# Patient Record
Sex: FEMALE | Race: WHITE | URBAN - NONMETROPOLITAN AREA
[De-identification: names, ages, dates, MRNs, and addresses within clinical notes are randomized per-mention and may not be internally consistent; named-entity substitution may affect disease eponyms.]

---

## 2024-07-01 ENCOUNTER — TELEPHONE (OUTPATIENT)
Dept: HEMATOLOGY | Age: 34
End: 2024-07-01

## 2024-07-01 RX ORDER — KETOROLAC TROMETHAMINE 10 MG/1
10 TABLET, FILM COATED ORAL EVERY 6 HOURS PRN
COMMUNITY

## 2024-07-01 RX ORDER — BUPROPION HYDROCHLORIDE 150 MG/1
150 TABLET ORAL EVERY MORNING
COMMUNITY

## 2024-07-01 RX ORDER — M-VIT,TX,IRON,MINS/CALC/FOLIC 27MG-0.4MG
1 TABLET ORAL DAILY
COMMUNITY

## 2024-07-01 NOTE — TELEPHONE ENCOUNTER
Called patient and reminded patient of their appointment on 7/3/2024 and patient confirmed they would be here.

## 2024-07-01 NOTE — PROGRESS NOTES
Thrombocytopenia (HCC)    4. Encounter for screening for other viral diseases        ASSESSMENT/PLAN:      Scot reports that she has known about low platelets for several years.  She reports that she has always noticed that her MPV is elevated as well.  She is also known that she has had intermittent low white count.  She denies any bleeding with any surgical procedures.    She has experienced a few urinary tract infections over the past couple years.  She did have an infection requiring IV antibiotics after she had a wisdom tooth pulled in the past.  She denies any recurrent pulmonary infections.  She has had COVID-19 on 2 separate occasions-the second 1 was more drastic but she recovered well.    She denies any history of liver dysfunction or any autoimmune illnesses.    Serology 6/5/2024  TSH-1.27  CMP-WNL            CBC today reveals a WBC of 3.29 with 38.7% PMN with ANC 1.27, 48.9% lymphocyte, 9.7% monocyte, 1.8% eosinophil, 1.9% basophil, Hgb 13.6, MCV 95.9, PLT 1 45,000.    I discussed the abnormalities noted on her CBC.  She has mild leukopenia without neutropenia.  She does have more lymphocytes than PMN but no priyanka relative lymphocytosis.  She does not have any palpable peripheral lymphadenopathy no hepatosplenomegaly.  No B symptoms.    She has mild macrocytosis, very minimal thrombocytopenia with slightly elevated MPV.  I discussed the fact that it appears that her bone marrow is making platelets.  I discussed potential chronic ITP or possibly even underlying autoimmune leukopenia.      She does not have any significant history of chronic or recurrent significant infections.  She has had a few urinary tract infections that respond to antibiotics well.    I discussed potential causes of her mild leukopenia, thrombocytopenia, macrocytosis.  This could include but not limited to nutritional deficiencies, autoimmune illnesses, viral illnesses, primary bone marrow disorders.  I am sending peripheral blood

## 2024-07-03 ENCOUNTER — HOSPITAL ENCOUNTER (OUTPATIENT)
Dept: INFUSION THERAPY | Age: 34
Discharge: HOME | End: 2024-07-03
Payer: COMMERCIAL

## 2024-07-03 ENCOUNTER — OFFICE VISIT (OUTPATIENT)
Dept: HEMATOLOGY | Age: 34
End: 2024-07-03
Payer: COMMERCIAL

## 2024-07-03 VITALS
HEIGHT: 66 IN | OXYGEN SATURATION: 99 % | HEART RATE: 58 BPM | TEMPERATURE: 98.7 F | DIASTOLIC BLOOD PRESSURE: 72 MMHG | BODY MASS INDEX: 23.77 KG/M2 | WEIGHT: 147.9 LBS | SYSTOLIC BLOOD PRESSURE: 114 MMHG

## 2024-07-03 DIAGNOSIS — D72.819 LEUKOPENIA, UNSPECIFIED TYPE: ICD-10-CM

## 2024-07-03 DIAGNOSIS — D69.6 THROMBOCYTOPENIA (HCC): ICD-10-CM

## 2024-07-03 DIAGNOSIS — Z11.59 ENCOUNTER FOR SCREENING FOR OTHER VIRAL DISEASES: ICD-10-CM

## 2024-07-03 DIAGNOSIS — D75.89 MACROCYTOSIS: ICD-10-CM

## 2024-07-03 DIAGNOSIS — D72.819 LEUKOPENIA, UNSPECIFIED TYPE: Primary | ICD-10-CM

## 2024-07-03 LAB
ALBUMIN SERPL-MCNC: 4.9 G/DL (ref 3.5–5.2)
ALP SERPL-CCNC: 55 U/L (ref 35–104)
ALT SERPL-CCNC: 22 U/L (ref 9–52)
ANION GAP SERPL CALCULATED.3IONS-SCNC: 11 MMOL/L (ref 7–19)
APTT PPP: 27.4 SEC (ref 26–36.2)
AST SERPL-CCNC: 27 U/L (ref 14–36)
BASOPHILS # BLD: 0.03 K/UL (ref 0.01–0.08)
BASOPHILS NFR BLD: 0.9 % (ref 0.1–1.2)
BILIRUB SERPL-MCNC: 1 MG/DL (ref 0.2–1.3)
BUN SERPL-MCNC: 14 MG/DL (ref 7–17)
CALCIUM SERPL-MCNC: 9.5 MG/DL (ref 8.4–10.2)
CHLORIDE SERPL-SCNC: 105 MMOL/L (ref 98–111)
CO2 SERPL-SCNC: 26 MMOL/L (ref 22–29)
CREAT SERPL-MCNC: 0.8 MG/DL (ref 0.5–1)
EOSINOPHIL # BLD: 0.06 K/UL (ref 0.04–0.54)
EOSINOPHIL NFR BLD: 1.8 % (ref 0.7–7)
ERYTHROCYTE [DISTWIDTH] IN BLOOD BY AUTOMATED COUNT: 11.4 % (ref 11.7–14.4)
FOLATE SERPL-MCNC: >20 NG/ML (ref 4.8–37.3)
GLOBULIN: 2.8 G/DL
GLUCOSE SERPL-MCNC: 86 MG/DL (ref 74–106)
HAPTOGLOB SERPL-MCNC: 85 MG/DL (ref 30–200)
HAV IGM SERPL QL IA: NORMAL
HBV CORE IGM SERPL QL IA: NORMAL
HBV SURFACE AG SERPL QL IA: NORMAL
HCT VFR BLD AUTO: 39.5 % (ref 34.1–44.9)
HCV AB SERPL QL IA: NORMAL
HGB BLD-MCNC: 13.6 G/DL (ref 11.2–15.7)
HIV-1 P24 AG: NORMAL
HIV1+2 AB SERPLBLD QL IA.RAPID: NORMAL
INR PPP: 1.14 (ref 0.88–1.18)
LYMPHOCYTES # BLD: 1.61 K/UL (ref 1.18–3.74)
LYMPHOCYTES NFR BLD: 48.9 % (ref 19.3–53.1)
MCH RBC QN AUTO: 33 PG (ref 25.6–32.2)
MCHC RBC AUTO-ENTMCNC: 34.4 G/DL (ref 32.3–35.5)
MCV RBC AUTO: 95.9 FL (ref 79.4–94.8)
MONOCYTES # BLD: 0.32 K/UL (ref 0.24–0.82)
MONOCYTES NFR BLD: 9.7 % (ref 4.7–12.5)
NEUTROPHILS # BLD: 1.27 K/UL (ref 1.56–6.13)
NEUTS SEG NFR BLD: 38.7 % (ref 34–71.1)
PLATELET # BLD AUTO: 145 K/UL (ref 182–369)
PLATELETS.RETICULATED NFR BLD AUTO: 5.3 % (ref 0.9–6.5)
PMV BLD AUTO: 10.8 FL (ref 7.4–10.4)
POTASSIUM SERPL-SCNC: 3.7 MMOL/L (ref 3.5–5.1)
PROT SERPL-MCNC: 7.7 G/DL (ref 6.3–8.2)
PROTHROMBIN TIME: 14.3 SEC (ref 12–14.6)
RBC # BLD AUTO: 4.12 M/UL (ref 3.93–5.22)
RHEUMATOID FACT SER NEPH-ACNC: <10 IU/ML
SODIUM SERPL-SCNC: 142 MMOL/L (ref 137–145)
VIT B12 SERPL-MCNC: 1076 PG/ML (ref 211–946)
WBC # BLD AUTO: 3.29 K/UL (ref 3.98–10.04)

## 2024-07-03 PROCEDURE — 99202 OFFICE O/P NEW SF 15 MIN: CPT

## 2024-07-03 PROCEDURE — 99204 OFFICE O/P NEW MOD 45 MIN: CPT | Performed by: PHYSICIAN ASSISTANT

## 2024-07-03 PROCEDURE — 1036F TOBACCO NON-USER: CPT | Performed by: PHYSICIAN ASSISTANT

## 2024-07-03 PROCEDURE — 85025 COMPLETE CBC W/AUTO DIFF WBC: CPT

## 2024-07-03 PROCEDURE — G8427 DOCREV CUR MEDS BY ELIG CLIN: HCPCS | Performed by: PHYSICIAN ASSISTANT

## 2024-07-03 PROCEDURE — 80053 COMPREHEN METABOLIC PANEL: CPT

## 2024-07-03 PROCEDURE — 36415 COLL VENOUS BLD VENIPUNCTURE: CPT

## 2024-07-03 PROCEDURE — G8420 CALC BMI NORM PARAMETERS: HCPCS | Performed by: PHYSICIAN ASSISTANT

## 2024-07-03 ASSESSMENT — ENCOUNTER SYMPTOMS
WHEEZING: 0
BLOOD IN STOOL: 0
COUGH: 0
VOMITING: 0
PHOTOPHOBIA: 0
EYE ITCHING: 0
VOICE CHANGE: 0
CONSTIPATION: 0
SHORTNESS OF BREATH: 0
SORE THROAT: 0
ABDOMINAL DISTENTION: 0
BACK PAIN: 0
NAUSEA: 0
ABDOMINAL PAIN: 0
DIARRHEA: 0
EYE DISCHARGE: 0
COLOR CHANGE: 0
TROUBLE SWALLOWING: 0

## 2024-07-05 LAB
COPPER SERPL-MCNC: 96.5 UG/DL (ref 80–155)
NUCLEAR IGG SER QL IA: NORMAL
ZINC SERPL-MCNC: 83.8 UG/DL (ref 60–120)

## 2024-07-06 LAB
ALBUMIN SERPL-MCNC: 4.56 G/DL (ref 3.75–5.01)
ALPHA1 GLOB SERPL ELPH-MCNC: 0.25 G/DL (ref 0.19–0.46)
ALPHA2 GLOB SERPL ELPH-MCNC: 0.58 G/DL (ref 0.48–1.05)
B-GLOBULIN SERPL ELPH-MCNC: 0.74 G/DL (ref 0.48–1.1)
DEPRECATED KAPPA LC FREE/LAMBDA SER: 1.01 {RATIO} (ref 0.26–1.65)
EER MONOCLONAL PROTEIN AND FLC, SERUM: NORMAL
GAMMA GLOB SERPL ELPH-MCNC: 1.07 G/DL (ref 0.62–1.51)
IGA SERPL-MCNC: 170 MG/DL (ref 68–408)
IGG SERPL-MCNC: 1025 MG/DL (ref 768–1632)
IGM SERPL-MCNC: 143 MG/DL (ref 35–263)
INTERPRETATION SERPL IFE-IMP: NORMAL
INTERPRETATION SERPL IFE-IMP: NORMAL
KAPPA LC FREE SER-MCNC: 16.6 MG/L (ref 3.3–19.4)
LAMBDA LC FREE SERPL-MCNC: 16.37 MG/L (ref 5.71–26.3)
MONOCLONAL PROTEIN, SERUM: NORMAL G/DL
PA IGG BLD QL FC: NEGATIVE
PA IGM BLD QL FC: POSITIVE
PROT SERPL-MCNC: 7.2 G/DL (ref 6.3–8.2)

## 2024-08-09 ENCOUNTER — TELEPHONE (OUTPATIENT)
Dept: HEMATOLOGY | Age: 34
End: 2024-08-09

## 2024-08-09 NOTE — TELEPHONE ENCOUNTER
Called patient and reminded patient of their appointment for 08/14/24. I made patient aware not to come at the lab appointment time but to come at ONLY the follow up appointment time. Patient voiced understanding of appointment information and was also advised that we are now in the [facility] and gave the address and instructions of exactly where we were located. Patient is aware not to arrive over 5-10 minutes early.

## 2024-08-13 NOTE — PROGRESS NOTES
Serology 6/5/2024  TSH-1.27  CMP-WNL             She does have a history of mononucleosis as a child.  She does report that her spleen did get enlarged.    Serology 7/3/2024  Copper-96.5  Zinc-83.8  [id number]  Folate->20.0    Haptoglobin-85.0    HIV 1&2-non-reactive  Hepatitis panel-non-reactive  RF-<10.0  JENNIFER-None detected  Immature platelet fraction-5.3  Platelet antibodies, direct-IGG negative, IGM positive    PT-14.3  INR-1.14  APTT-27.4    SPEP-No M spike, Negative Immunofixation   QI-IgG 1025, IgA 170, IgM 143  Free serum light chains- Kappa 16.60, Lambda 16.37, K/L 1.04  CMP-crt 0.8 with GFR >90, Ca 9.5         Past Medical History:   Diagnosis Date    Anxiety     Depression         Past Surgical History:   Procedure Laterality Date    BREAST ENHANCEMENT SURGERY Bilateral 01/01/2022    WISDOM TOOTH EXTRACTION  08/01/2023           Current Outpatient Medications:     Multiple Vitamins-Minerals (THERAPEUTIC MULTIVITAMIN-MINERALS) tablet, Take 1 tablet by mouth daily, Disp: , Rfl:     buPROPion (WELLBUTRIN XL) 150 MG extended release tablet, Take 1 tablet by mouth every morning, Disp: , Rfl:     ketorolac (TORADOL) 10 MG tablet, Take 1 tablet by mouth every 6 hours as needed for Pain Rarely takes this medication for migraines, Disp: , Rfl:      Allergies   Allergen Reactions    Sulfa Antibiotics        Social History     Tobacco Use    Smoking status: Never    Smokeless tobacco: Never   Vaping Use    Vaping status: Never Used   Substance Use Topics    Alcohol use: Yes     Comment: socially    Drug use: Never       No family history on file.    Subjective   REVIEW OF SYSTEMS:   Review of Systems   Constitutional:  Positive for fatigue (Mild). Negative for chills, diaphoresis, fever and unexpected weight change.   HENT:  Negative for mouth sores, nosebleeds, sore throat, trouble swallowing and voice change.    Eyes:  Negative for photophobia, discharge and itching.   Respiratory:  Negative for cough, shortness of

## 2024-08-14 ENCOUNTER — HOSPITAL ENCOUNTER (OUTPATIENT)
Dept: INFUSION THERAPY | Age: 34
Discharge: HOME | End: 2024-08-14
Payer: COMMERCIAL

## 2024-08-14 ENCOUNTER — OFFICE VISIT (OUTPATIENT)
Dept: HEMATOLOGY | Age: 34
End: 2024-08-14
Payer: COMMERCIAL

## 2024-08-14 VITALS
HEART RATE: 99 BPM | WEIGHT: 147.6 LBS | DIASTOLIC BLOOD PRESSURE: 78 MMHG | OXYGEN SATURATION: 99 % | BODY MASS INDEX: 23.72 KG/M2 | TEMPERATURE: 98.1 F | SYSTOLIC BLOOD PRESSURE: 130 MMHG | HEIGHT: 66 IN

## 2024-08-14 DIAGNOSIS — D69.6 THROMBOCYTOPENIA (HCC): ICD-10-CM

## 2024-08-14 DIAGNOSIS — D75.89 MACROCYTOSIS: ICD-10-CM

## 2024-08-14 DIAGNOSIS — Z86.19 HISTORY OF MONONUCLEOSIS: ICD-10-CM

## 2024-08-14 DIAGNOSIS — D72.819 LEUKOPENIA, UNSPECIFIED TYPE: Primary | ICD-10-CM

## 2024-08-14 DIAGNOSIS — D72.819 LEUKOPENIA, UNSPECIFIED TYPE: ICD-10-CM

## 2024-08-14 LAB
BASOPHILS # BLD: 0.02 K/UL (ref 0.01–0.08)
BASOPHILS NFR BLD: 0.5 % (ref 0.1–1.2)
EOSINOPHIL # BLD: 0.06 K/UL (ref 0.04–0.54)
EOSINOPHIL NFR BLD: 1.6 % (ref 0.7–7)
ERYTHROCYTE [DISTWIDTH] IN BLOOD BY AUTOMATED COUNT: 11.4 % (ref 11.7–14.4)
HCT VFR BLD AUTO: 40.4 % (ref 34.1–44.9)
HGB BLD-MCNC: 13.8 G/DL (ref 11.2–15.7)
LYMPHOCYTES # BLD: 1.38 K/UL (ref 1.18–3.74)
LYMPHOCYTES NFR BLD: 35.8 % (ref 19.3–53.1)
MCH RBC QN AUTO: 33.4 PG (ref 25.6–32.2)
MCHC RBC AUTO-ENTMCNC: 34.2 G/DL (ref 32.3–35.5)
MCV RBC AUTO: 97.8 FL (ref 79.4–94.8)
MONOCYTES # BLD: 0.41 K/UL (ref 0.24–0.82)
MONOCYTES NFR BLD: 10.6 % (ref 4.7–12.5)
NEUTROPHILS # BLD: 1.97 K/UL (ref 1.56–6.13)
NEUTS SEG NFR BLD: 51.2 % (ref 34–71.1)
PLATELET # BLD AUTO: 150 K/UL (ref 182–369)
PMV BLD AUTO: 10.8 FL (ref 7.4–10.4)
RBC # BLD AUTO: 4.13 M/UL (ref 3.93–5.22)
WBC # BLD AUTO: 3.85 K/UL (ref 3.98–10.04)

## 2024-08-14 PROCEDURE — 99214 OFFICE O/P EST MOD 30 MIN: CPT | Performed by: PHYSICIAN ASSISTANT

## 2024-08-14 PROCEDURE — 85025 COMPLETE CBC W/AUTO DIFF WBC: CPT

## 2024-08-14 PROCEDURE — G8420 CALC BMI NORM PARAMETERS: HCPCS | Performed by: PHYSICIAN ASSISTANT

## 2024-08-14 PROCEDURE — 36415 COLL VENOUS BLD VENIPUNCTURE: CPT

## 2024-08-14 PROCEDURE — 99212 OFFICE O/P EST SF 10 MIN: CPT

## 2024-08-14 PROCEDURE — G8427 DOCREV CUR MEDS BY ELIG CLIN: HCPCS | Performed by: PHYSICIAN ASSISTANT

## 2024-08-14 PROCEDURE — 1036F TOBACCO NON-USER: CPT | Performed by: PHYSICIAN ASSISTANT

## 2024-08-14 ASSESSMENT — ENCOUNTER SYMPTOMS
ABDOMINAL DISTENTION: 0
DIARRHEA: 0
PHOTOPHOBIA: 0
COUGH: 0
WHEEZING: 0
VOMITING: 0
CONSTIPATION: 0
EYE ITCHING: 0
NAUSEA: 0
COLOR CHANGE: 0
SORE THROAT: 0
TROUBLE SWALLOWING: 0
BACK PAIN: 0
BLOOD IN STOOL: 0
EYE DISCHARGE: 0
VOICE CHANGE: 0
ABDOMINAL PAIN: 0

## 2024-11-06 DIAGNOSIS — D69.6 THROMBOCYTOPENIA (HCC): ICD-10-CM

## 2024-11-06 DIAGNOSIS — D72.819 LEUKOPENIA, UNSPECIFIED TYPE: Primary | ICD-10-CM

## 2025-02-17 ENCOUNTER — TELEPHONE (OUTPATIENT)
Dept: HEMATOLOGY | Age: 35
End: 2025-02-17

## 2025-02-17 NOTE — TELEPHONE ENCOUNTER

## 2025-02-20 DIAGNOSIS — D69.6 THROMBOCYTOPENIA: ICD-10-CM

## 2025-02-20 DIAGNOSIS — D72.819 LEUKOPENIA, UNSPECIFIED TYPE: Primary | ICD-10-CM

## 2025-02-20 NOTE — PROGRESS NOTES
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  female with mild leukopenia without neutropenia and mild thrombocytopenia was seen in the office initially on 7/3/2024.  Peripheral blood flow cytometry was negative.  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    She did take an influenza vaccine this past fall.  She denies any increasing bruising.  She denies any recurrent infection.    She works in [employer] from home through [employer].    She does have issues with mild depression which continues to be stable on Wellbutrin  mg daily, buspirone 5 twice daily.  She has cluster headaches treated with Toradol 10 p.o. every 6 hours as needed-she may need this once a month.  She takes a multivitamin daily.      HEMATOLOGY HISTORY: Mild leukopenia without neutropenia and thrombocytopenia (possible low-grade ITP)  Scot was seen in initial hematology consultation on 7/3/2024 referred from [facility] for evaluation of leukopenia and thrombocytopenia.      Scot reports that she has known about low platelets for several years.  She reports that she has always noticed that her MPV is elevated as well.  She is also known that she has had intermittent low white count.  She denies any bleeding with any surgical procedures.     She has experienced a few urinary tract infections over the past couple years.  She did have an infection requiring IV antibiotics after she had a wisdom tooth pulled in the past.  She denies any recurrent pulmonary

## 2025-02-21 ENCOUNTER — HOSPITAL ENCOUNTER (OUTPATIENT)
Dept: INFUSION THERAPY | Age: 35
Discharge: HOME | End: 2025-02-21
Payer: COMMERCIAL

## 2025-02-21 ENCOUNTER — OFFICE VISIT (OUTPATIENT)
Dept: HEMATOLOGY | Age: 35
End: 2025-02-21
Payer: COMMERCIAL

## 2025-02-21 VITALS
HEART RATE: 78 BPM | SYSTOLIC BLOOD PRESSURE: 124 MMHG | DIASTOLIC BLOOD PRESSURE: 68 MMHG | OXYGEN SATURATION: 98 % | HEIGHT: 66 IN | TEMPERATURE: 96.9 F | BODY MASS INDEX: 24.01 KG/M2 | WEIGHT: 149.4 LBS

## 2025-02-21 DIAGNOSIS — D75.89 MACROCYTOSIS: ICD-10-CM

## 2025-02-21 DIAGNOSIS — D72.819 LEUKOPENIA, UNSPECIFIED TYPE: Primary | ICD-10-CM

## 2025-02-21 DIAGNOSIS — D69.6 THROMBOCYTOPENIA: ICD-10-CM

## 2025-02-21 DIAGNOSIS — Z86.19 HISTORY OF MONONUCLEOSIS: ICD-10-CM

## 2025-02-21 DIAGNOSIS — D72.819 LEUKOPENIA, UNSPECIFIED TYPE: ICD-10-CM

## 2025-02-21 LAB
BASOPHILS # BLD: 0.02 K/UL (ref 0–0.2)
BASOPHILS NFR BLD: 0.5 % (ref 0–1)
EOSINOPHIL # BLD: 0.08 K/UL (ref 0–0.6)
EOSINOPHIL NFR BLD: 2 % (ref 0–5)
ERYTHROCYTE [DISTWIDTH] IN BLOOD BY AUTOMATED COUNT: 11.4 % (ref 11.5–14.5)
HCT VFR BLD AUTO: 38.4 % (ref 37–47)
HGB BLD-MCNC: 13.4 G/DL (ref 12–16)
LYMPHOCYTES # BLD: 1.65 K/UL (ref 1.1–4.5)
LYMPHOCYTES NFR BLD: 41.5 % (ref 20–40)
MCH RBC QN AUTO: 32.8 PG (ref 27–31)
MCHC RBC AUTO-ENTMCNC: 34.9 G/DL (ref 33–37)
MCV RBC AUTO: 93.9 FL (ref 81–99)
MONOCYTES # BLD: 0.39 K/UL (ref 0–0.9)
MONOCYTES NFR BLD: 9.8 % (ref 1–10)
NEUTROPHILS # BLD: 1.83 K/UL (ref 1.5–7.5)
NEUTS SEG NFR BLD: 45.9 % (ref 50–65)
PLATELET # BLD AUTO: 146 K/UL (ref 130–400)
PMV BLD AUTO: 11.1 FL (ref 9.4–12.3)
RBC # BLD AUTO: 4.09 M/UL (ref 4.2–5.4)
WBC # BLD AUTO: 3.98 K/UL (ref 4.8–10.8)

## 2025-02-21 PROCEDURE — 99211 OFF/OP EST MAY X REQ PHY/QHP: CPT

## 2025-02-21 PROCEDURE — 36415 COLL VENOUS BLD VENIPUNCTURE: CPT

## 2025-02-21 PROCEDURE — 99213 OFFICE O/P EST LOW 20 MIN: CPT | Performed by: PHYSICIAN ASSISTANT

## 2025-02-21 PROCEDURE — 85025 COMPLETE CBC W/AUTO DIFF WBC: CPT

## 2025-02-21 RX ORDER — BUSPIRONE HYDROCHLORIDE 5 MG/1
5 TABLET ORAL 2 TIMES DAILY
COMMUNITY
Start: 2024-10-21

## 2025-02-21 ASSESSMENT — ENCOUNTER SYMPTOMS
EYE DISCHARGE: 0
DIARRHEA: 0
VOMITING: 0
SORE THROAT: 0
WHEEZING: 0
ABDOMINAL PAIN: 0
COUGH: 0
BLOOD IN STOOL: 0
ABDOMINAL DISTENTION: 0
EYE ITCHING: 0
SHORTNESS OF BREATH: 0
NAUSEA: 0
VOICE CHANGE: 0
BACK PAIN: 0
TROUBLE SWALLOWING: 0
PHOTOPHOBIA: 0
CONSTIPATION: 0
COLOR CHANGE: 0